# Patient Record
Sex: MALE | Race: OTHER | HISPANIC OR LATINO
[De-identification: names, ages, dates, MRNs, and addresses within clinical notes are randomized per-mention and may not be internally consistent; named-entity substitution may affect disease eponyms.]

---

## 2023-01-12 PROBLEM — Z00.00 ENCOUNTER FOR PREVENTIVE HEALTH EXAMINATION: Status: ACTIVE | Noted: 2023-01-12

## 2023-01-23 DIAGNOSIS — N40.0 BENIGN PROSTATIC HYPERPLASIA WITHOUT LOWER URINARY TRACT SYMPMS: ICD-10-CM

## 2023-01-24 ENCOUNTER — APPOINTMENT (OUTPATIENT)
Dept: SURGERY | Facility: CLINIC | Age: 58
End: 2023-01-24
Payer: COMMERCIAL

## 2023-01-24 ENCOUNTER — NON-APPOINTMENT (OUTPATIENT)
Age: 58
End: 2023-01-24

## 2023-01-24 VITALS
OXYGEN SATURATION: 97 % | TEMPERATURE: 98.8 F | WEIGHT: 140 LBS | BODY MASS INDEX: 21.97 KG/M2 | HEIGHT: 66.75 IN | HEART RATE: 59 BPM | SYSTOLIC BLOOD PRESSURE: 120 MMHG | DIASTOLIC BLOOD PRESSURE: 74 MMHG

## 2023-01-24 DIAGNOSIS — Z80.42 FAMILY HISTORY OF MALIGNANT NEOPLASM OF PROSTATE: ICD-10-CM

## 2023-01-24 DIAGNOSIS — R10.31 RIGHT LOWER QUADRANT PAIN: ICD-10-CM

## 2023-01-24 DIAGNOSIS — K52.9 NONINFECTIVE GASTROENTERITIS AND COLITIS, UNSPECIFIED: ICD-10-CM

## 2023-01-24 DIAGNOSIS — Z80.0 FAMILY HISTORY OF MALIGNANT NEOPLASM OF DIGESTIVE ORGANS: ICD-10-CM

## 2023-01-24 DIAGNOSIS — Z78.9 OTHER SPECIFIED HEALTH STATUS: ICD-10-CM

## 2023-01-24 PROCEDURE — 99203 OFFICE O/P NEW LOW 30 MIN: CPT

## 2023-01-24 RX ORDER — SULFASALAZINE 500 MG/1
500 TABLET ORAL
Refills: 0 | Status: ACTIVE | COMMUNITY

## 2023-01-24 RX ORDER — TAMSULOSIN HYDROCHLORIDE 0.4 MG/1
0.4 CAPSULE ORAL
Refills: 0 | Status: COMPLETED | COMMUNITY
End: 2023-01-24

## 2023-01-24 NOTE — ASSESSMENT
[FreeTextEntry1] : Referring physician Dr. De Los Santos\par \par Date 1/24/2023\par \par Reason for referral right groin pain rule out right inguinal hernia\par \par This is a 57-year-old gentleman with a 4-month history of having episodic discomfort and pain in the right groin region.  This is exacerbated with coughing.  The patient also states he has pain when he puts effort such as lifting bending or Valsalva type maneuvers in the right groin.  He has pain in the right testicle as well.  There is also pain around the right testicle.  There is also occasionally a burning sensation in that region.  Patient denies having a lump in the groin.\par \par Patient has no complaints of nausea vomiting no change in bowel habits.\par \par Physical examination: Patient examined erect and supine position.  The abdomen soft nontender nondistended he has a right inguinal hernia of moderate size.  It is reducible in the supine position right scrotum testicles within normal limits.  There is laxity noted in left groin a small left inguinal hernia cannot be entirely ruled out.  Scrotum and testicle within normal limits on the left as well.\par \par Impression/plan right groin pain, right inguinal hernia left floor laxity: This is a 57-year-old gentleman with a 4-month history of having nonspecific discomfort in the right groin and in the right testicle.  Typically the hernia can cause this kind of pain however the patient's pain may be totally unrelated to the hernia and the hernia is just an incidental finding.  There is no way to distinguish the exact etiology of this pain and this was clearly conveyed to this patient at length.  At this point I would recommend repairing the right inguinal hernia laparoscopically and examining the left side and if a hernia is noted it will be repaired at the same setting.  The patient and myself had a long conversation regarding the pros and cons of repairing the hernia at this time.  Patient understands he has a moderate sized hernia and now wishes to have this repaired.  He also understands very clearly that the pain may or may not be resolved with the repair of the hernia.\par \par This consultation was done in its entirety with a  named Aretha.  Her name is [id number].  His questions were answered during this consultation as well.\par \par The indications alternatives and complication of procedure discussed questions answered.  Written consent obtained in the office today.

## 2023-03-24 ENCOUNTER — APPOINTMENT (OUTPATIENT)
Dept: SURGERY | Facility: HOSPITAL | Age: 58
End: 2023-03-24

## 2023-03-27 DIAGNOSIS — K40.90 UNILATERAL INGUINAL HERNIA, W/OUT OBSTRUCTION OR GANGRENE, NOT SPECIFIED AS RECURRENT: ICD-10-CM

## 2023-03-28 ENCOUNTER — APPOINTMENT (OUTPATIENT)
Dept: SURGERY | Facility: CLINIC | Age: 58
End: 2023-03-28
Payer: COMMERCIAL

## 2023-03-28 VITALS
SYSTOLIC BLOOD PRESSURE: 122 MMHG | TEMPERATURE: 98.2 F | DIASTOLIC BLOOD PRESSURE: 79 MMHG | WEIGHT: 140 LBS | BODY MASS INDEX: 21.97 KG/M2 | RESPIRATION RATE: 18 BRPM | HEIGHT: 66.75 IN | HEART RATE: 71 BPM | OXYGEN SATURATION: 100 %

## 2023-03-28 PROCEDURE — 99214 OFFICE O/P EST MOD 30 MIN: CPT

## 2023-03-28 NOTE — ASSESSMENT
[FreeTextEntry1] : Patient presents today to have his right inguinal hernia fixed.  Due to the fact that its been several months since I had last seen him I asked that he return to be reevaluated prior to any surgical intervention.  Patient states he has the same complaints that he had when I had seen him in January 2023.  He still complains of having a burning sensation in the right groin along the right testicle lateral edge.  He f describes a sensation that it is like cutting himself.  He states that when he walks or he is at work doing lifting type activities the discomfort and pain is much more excruciating.  This pain is also exacerbated with stretching type activities.  Makes it very clear that lately he has pain in the right testicle right groin and it radiates down his right inner thigh.  He states that this is worse than when I had last seen him in the office.  There is no complaints of nausea or vomiting.  He denies feeling a lump in the right groin.\par \par Physical examination patient examined erect and supine position.  The abdomen soft nontender nondistended he still has a right inguinal hernia.  It is essentially unchanged since I had last seen him.  The right scrotum and testicle appears to be within normal limits on physical examination.  He has right-sided pubalgia he has pain in the right hip region with internal and external rotation and flexion.  Internal rotation is weaker due to pain.\par \par Impression/plan right inguinal hernia, right groin pain: This is a 50-year-old gentleman with similar complaints since I last seen him however he is here to discuss having his hernia repaired.  Given the fact that his primary concern is pain in the right groin I am not entirely sure that the hernia is causing all this discomfort he describes.  Given his exam today I suspect his pain is most likely muscular or skeletal in nature.  Hernias typically do not give people pubalgia or difficulty with internal and external rotation or flexion at the hip.  Due to this finding I think he is best evaluated by an orthopedic surgeon as well as possibly a physiatrist and recommend management with physical therapy.  Since I do not think his hernia is causing this problem I have referred him back to his primary care physician Dr. De Los Santos for further evaluation and recommendation to the various specialties.  Not think fixing his hernia at this point is smart given the fact that I do not think his pain is not related to the hernia and following the surgery the pain will still remain.  Patient will return to see me on a as needed basis\par \par This gentleman's consultation was done with Olga coughlin  and her ID is [id number]